# Patient Record
Sex: FEMALE
[De-identification: names, ages, dates, MRNs, and addresses within clinical notes are randomized per-mention and may not be internally consistent; named-entity substitution may affect disease eponyms.]

---

## 2017-08-21 PROCEDURE — 88175 CYTOPATH C/V AUTO FLUID REDO: CPT | Performed by: OBSTETRICS & GYNECOLOGY

## 2017-08-21 PROCEDURE — 87624 HPV HI-RISK TYP POOLED RSLT: CPT | Performed by: OBSTETRICS & GYNECOLOGY

## 2018-02-02 ENCOUNTER — CHARTING TRANS (OUTPATIENT)
Dept: OTHER | Age: 33
End: 2018-02-02

## 2018-05-16 PROBLEM — R10.11 RUQ ABDOMINAL PAIN: Status: ACTIVE | Noted: 2018-05-16

## 2018-06-27 ENCOUNTER — HOSPITAL ENCOUNTER (OUTPATIENT)
Age: 33
Discharge: HOME | End: 2018-06-27
Payer: COMMERCIAL

## 2018-06-27 VITALS
TEMPERATURE: 98 F | DIASTOLIC BLOOD PRESSURE: 91 MMHG | OXYGEN SATURATION: 98 % | HEART RATE: 91 BPM | SYSTOLIC BLOOD PRESSURE: 134 MMHG | RESPIRATION RATE: 22 BRPM

## 2018-06-27 DIAGNOSIS — J01.91 ACUTE RECURRENT SINUSITIS, UNSPECIFIED LOCATION: Primary | ICD-10-CM

## 2018-06-27 DIAGNOSIS — J30.9 ALLERGIC RHINITIS, UNSPECIFIED SEASONALITY, UNSPECIFIED TRIGGER: ICD-10-CM

## 2018-06-27 PROCEDURE — 99213 OFFICE O/P EST LOW 20 MIN: CPT

## 2018-06-27 PROCEDURE — 99214 OFFICE O/P EST MOD 30 MIN: CPT

## 2018-06-27 PROCEDURE — 81025 URINE PREGNANCY TEST: CPT | Performed by: PHYSICIAN ASSISTANT

## 2018-06-27 RX ORDER — IBUPROFEN 600 MG/1
600 TABLET ORAL EVERY 8 HOURS PRN
Qty: 30 TABLET | Refills: 0 | Status: SHIPPED | OUTPATIENT
Start: 2018-06-27 | End: 2018-07-04

## 2018-06-27 RX ORDER — DOXYCYCLINE HYCLATE 100 MG
100 TABLET ORAL 2 TIMES DAILY
Qty: 20 TABLET | Refills: 0 | Status: SHIPPED | OUTPATIENT
Start: 2018-06-27 | End: 2018-07-07

## 2018-06-27 RX ORDER — PREDNISONE 20 MG/1
60 TABLET ORAL ONCE
Status: COMPLETED | OUTPATIENT
Start: 2018-06-27 | End: 2018-06-27

## 2018-06-27 RX ORDER — PREDNISONE 20 MG/1
40 TABLET ORAL DAILY
Qty: 8 TABLET | Refills: 0 | Status: SHIPPED | OUTPATIENT
Start: 2018-06-27 | End: 2018-07-01

## 2018-06-27 RX ORDER — IBUPROFEN 600 MG/1
600 TABLET ORAL ONCE
Status: COMPLETED | OUTPATIENT
Start: 2018-06-27 | End: 2018-06-27

## 2018-06-28 NOTE — ED INITIAL ASSESSMENT (HPI)
Complains of headache, nasal congestion and runny nose for the last three days. Here today, still having headaches. No nausea or vomiting.

## 2018-06-28 NOTE — ED PROVIDER NOTES
Patient Seen in: [facility]    History   Patient presents with:  Headache    Stated Complaint: HEADACHE    HPI    11-year-old female here with complaint of sinus pain and pressure with nasal congestion and headache for the past Normocephalic and atraumatic. Right Ear: External ear and ear canal normal. Tympanic membrane is bulging. Left Ear: External ear and ear canal normal. Tympanic membrane is bulging. Nose: Mucosal edema and rhinorrhea present.  Right sinus exhibits maxi MD  [address]  [phone]    Schedule an appointment as soon as possible for a visit       Cosmo Nissen, MD  [address] [facility]  [phone]    Schedule an appointment as soon as possible

## 2018-11-02 VITALS
TEMPERATURE: 98.8 F | BODY MASS INDEX: 19.25 KG/M2 | RESPIRATION RATE: 18 BRPM | SYSTOLIC BLOOD PRESSURE: 112 MMHG | WEIGHT: 129.99 LBS | DIASTOLIC BLOOD PRESSURE: 72 MMHG | HEART RATE: 82 BPM | HEIGHT: 69 IN | OXYGEN SATURATION: 97 %

## 2019-05-28 ENCOUNTER — WALK IN (OUTPATIENT)
Dept: URGENT CARE | Age: 34
End: 2019-05-28

## 2019-05-28 DIAGNOSIS — J00 ACUTE NASOPHARYNGITIS (COMMON COLD): Primary | ICD-10-CM

## 2019-05-28 PROCEDURE — 99203 OFFICE O/P NEW LOW 30 MIN: CPT | Performed by: NURSE PRACTITIONER

## 2019-05-28 ASSESSMENT — ENCOUNTER SYMPTOMS
RHINORRHEA: 1
CHILLS: 0
SORE THROAT: 0
NUMBNESS: 0
FEVER: 0
WHEEZING: 0
SINUS PRESSURE: 1
HEADACHES: 1
WEAKNESS: 0
LIGHT-HEADEDNESS: 0
SHORTNESS OF BREATH: 0
DIZZINESS: 0
COUGH: 1
CHEST TIGHTNESS: 0
TROUBLE SWALLOWING: 0

## 2019-05-28 ASSESSMENT — PAIN SCALES - GENERAL: PAINLEVEL: 3-4

## 2019-06-18 PROBLEM — N90.7 INCLUSION CYST OF VULVA: Status: ACTIVE | Noted: 2019-06-18

## 2019-06-18 PROCEDURE — 87086 URINE CULTURE/COLONY COUNT: CPT | Performed by: OBSTETRICS & GYNECOLOGY

## 2019-06-18 PROCEDURE — 87186 SC STD MICRODIL/AGAR DIL: CPT | Performed by: OBSTETRICS & GYNECOLOGY

## 2019-06-18 PROCEDURE — 87077 CULTURE AEROBIC IDENTIFY: CPT | Performed by: OBSTETRICS & GYNECOLOGY

## 2021-05-26 VITALS
DIASTOLIC BLOOD PRESSURE: 74 MMHG | SYSTOLIC BLOOD PRESSURE: 114 MMHG | TEMPERATURE: 98.7 F | HEIGHT: 69 IN | WEIGHT: 130 LBS | HEART RATE: 88 BPM | OXYGEN SATURATION: 98 % | BODY MASS INDEX: 19.26 KG/M2 | RESPIRATION RATE: 18 BRPM